# Patient Record
Sex: MALE | Race: WHITE | NOT HISPANIC OR LATINO | URBAN - NONMETROPOLITAN AREA
[De-identification: names, ages, dates, MRNs, and addresses within clinical notes are randomized per-mention and may not be internally consistent; named-entity substitution may affect disease eponyms.]

---

## 2021-01-27 ENCOUNTER — OFFICE VISIT (OUTPATIENT)
Dept: URBAN - NONMETROPOLITAN AREA CLINIC 2 | Facility: CLINIC | Age: 21
End: 2021-01-27

## 2021-02-05 ENCOUNTER — LAB OUTSIDE AN ENCOUNTER (OUTPATIENT)
Dept: URBAN - NONMETROPOLITAN AREA CLINIC 2 | Facility: CLINIC | Age: 21
End: 2021-02-05

## 2021-02-05 ENCOUNTER — OFFICE VISIT (OUTPATIENT)
Dept: URBAN - NONMETROPOLITAN AREA CLINIC 2 | Facility: CLINIC | Age: 21
End: 2021-02-05
Payer: COMMERCIAL

## 2021-02-05 ENCOUNTER — WEB ENCOUNTER (OUTPATIENT)
Dept: URBAN - NONMETROPOLITAN AREA CLINIC 2 | Facility: CLINIC | Age: 21
End: 2021-02-05

## 2021-02-05 DIAGNOSIS — K21.9 GASTROESOPHAGEAL REFLUX DISEASE, UNSPECIFIED WHETHER ESOPHAGITIS PRESENT: ICD-10-CM

## 2021-02-05 PROCEDURE — 99204 OFFICE O/P NEW MOD 45 MIN: CPT | Performed by: INTERNAL MEDICINE

## 2021-02-05 PROCEDURE — G8420 CALC BMI NORM PARAMETERS: HCPCS | Performed by: INTERNAL MEDICINE

## 2021-02-05 PROCEDURE — G9906 PT RECV TBCO CESS INTERV: HCPCS | Performed by: INTERNAL MEDICINE

## 2021-02-05 PROCEDURE — G8427 DOCREV CUR MEDS BY ELIG CLIN: HCPCS | Performed by: INTERNAL MEDICINE

## 2021-02-05 PROCEDURE — G8484 FLU IMMUNIZE NO ADMIN: HCPCS | Performed by: INTERNAL MEDICINE

## 2021-02-05 RX ORDER — PANTOPRAZOLE SODIUM 40 MG/1
1 TABLET TABLET, DELAYED RELEASE ORAL ONCE A DAY
Qty: 90 TABLET | Refills: 3 | OUTPATIENT
Start: 2021-02-05

## 2021-02-05 RX ORDER — FAMOTIDINE 40 MG/1
1 TABLET AT BEDTIME TABLET, FILM COATED ORAL ONCE A DAY
Qty: 90 TABLET | Refills: 3 | OUTPATIENT
Start: 2021-02-05

## 2021-02-05 NOTE — HPI-TODAY'S VISIT:
This is the first office visit for this 20-year-old white male who  is self-referred with complaints of acid reflux.  He  describes burning epigastric pain that is associated with belching and regurgitation of "hot liquid" into his chest.  Is not associated with coughing or sore throat.  It seems to occur more frequently after eating and is most significant in the am upon arising. It will awaken him from sleep at night.  He has spit up some "black" liquid.  He has had no bright red hematemesis.  He denies melena.  There is some questionable mild solid food dysphagia.  Tums give him temporary relief.  He takes Nexium as needed which helps as well. He denies ingestion of gastric irritants.  There is no family history of peptic ulcer disease.  He does vape and drink alcohol about once per week.  He also smokes marijuana. His bowel movements are normal.  His appetite and weight are stable.

## 2021-03-23 ENCOUNTER — CLAIMS CREATED FROM THE CLAIM WINDOW (OUTPATIENT)
Dept: URBAN - METROPOLITAN AREA CLINIC 4 | Facility: CLINIC | Age: 21
End: 2021-03-23
Payer: COMMERCIAL

## 2021-03-23 ENCOUNTER — OFFICE VISIT (OUTPATIENT)
Dept: URBAN - NONMETROPOLITAN AREA SURGERY CENTER 1 | Facility: SURGERY CENTER | Age: 21
End: 2021-03-23
Payer: COMMERCIAL

## 2021-03-23 DIAGNOSIS — K21.00 GASTRO-ESOPHAGEAL REFLUX DISEASE WITH ESOPHAGITIS, WITHOUT BLEEDING: ICD-10-CM

## 2021-03-23 DIAGNOSIS — K31.89 ISOLATED IDIOPATHIC GRANULOMA OF STOMACH: ICD-10-CM

## 2021-03-23 DIAGNOSIS — K22.8 OTHER SPECIFIED DISEASES OF ESOPHAGUS: ICD-10-CM

## 2021-03-23 DIAGNOSIS — K21.9 ACID REFLUX: ICD-10-CM

## 2021-03-23 PROCEDURE — 43239 EGD BIOPSY SINGLE/MULTIPLE: CPT | Performed by: INTERNAL MEDICINE

## 2021-03-23 PROCEDURE — 88305 TISSUE EXAM BY PATHOLOGIST: CPT | Performed by: PATHOLOGY

## 2021-03-23 PROCEDURE — 88312 SPECIAL STAINS GROUP 1: CPT | Performed by: PATHOLOGY

## 2021-03-23 PROCEDURE — G8907 PT DOC NO EVENTS ON DISCHARG: HCPCS | Performed by: INTERNAL MEDICINE

## 2021-03-30 ENCOUNTER — TELEPHONE ENCOUNTER (OUTPATIENT)
Dept: URBAN - METROPOLITAN AREA CLINIC 92 | Facility: CLINIC | Age: 21
End: 2021-03-30

## 2021-05-07 ENCOUNTER — OFFICE VISIT (OUTPATIENT)
Dept: URBAN - NONMETROPOLITAN AREA CLINIC 13 | Facility: CLINIC | Age: 21
End: 2021-05-07

## 2021-05-12 ENCOUNTER — LAB OUTSIDE AN ENCOUNTER (OUTPATIENT)
Dept: URBAN - METROPOLITAN AREA CLINIC 92 | Facility: CLINIC | Age: 21
End: 2021-05-12

## 2021-05-24 ENCOUNTER — OFFICE VISIT (OUTPATIENT)
Dept: URBAN - METROPOLITAN AREA MEDICAL CENTER 1 | Facility: MEDICAL CENTER | Age: 21
End: 2021-05-24
Payer: COMMERCIAL

## 2021-05-24 DIAGNOSIS — Z12.11 COLON CANCER SCREENING: ICD-10-CM

## 2021-05-24 PROCEDURE — 996 AG2 NON-BILLABLE: Performed by: INTERNAL MEDICINE

## 2021-06-21 ENCOUNTER — LAB OUTSIDE AN ENCOUNTER (OUTPATIENT)
Dept: URBAN - NONMETROPOLITAN AREA CLINIC 2 | Facility: CLINIC | Age: 21
End: 2021-06-21

## 2021-06-21 ENCOUNTER — OFFICE VISIT (OUTPATIENT)
Dept: URBAN - NONMETROPOLITAN AREA CLINIC 2 | Facility: CLINIC | Age: 21
End: 2021-06-21
Payer: COMMERCIAL

## 2021-06-21 DIAGNOSIS — K21.9 GASTROESOPHAGEAL REFLUX DISEASE, UNSPECIFIED WHETHER ESOPHAGITIS PRESENT: ICD-10-CM

## 2021-06-21 DIAGNOSIS — Z72.0 TOBACCO ABUSE: ICD-10-CM

## 2021-06-21 PROCEDURE — 99214 OFFICE O/P EST MOD 30 MIN: CPT | Performed by: INTERNAL MEDICINE

## 2021-06-21 RX ORDER — FAMOTIDINE 40 MG/1
1 TABLET AT BEDTIME TABLET, FILM COATED ORAL ONCE A DAY
Qty: 90 TABLET | Refills: 3 | Status: ON HOLD | COMMUNITY
Start: 2021-02-05

## 2021-06-21 RX ORDER — PANTOPRAZOLE SODIUM 40 MG/1
1 TABLET TABLET, DELAYED RELEASE ORAL BID
OUTPATIENT
Start: 2021-02-05

## 2021-06-21 RX ORDER — PANTOPRAZOLE SODIUM 40 MG/1
1 TABLET TABLET, DELAYED RELEASE ORAL BID
Qty: 180 TABLET | Refills: 3 | Status: ACTIVE | COMMUNITY
Start: 2021-02-05

## 2021-06-21 NOTE — HPI-TODAY'S VISIT:
2/5/21-Dr. Barbour This is the first office visit for this 20-year-old white male who  is self-referred with complaints of acid reflux.  He  describes burning epigastric pain that is associated with belching and regurgitation of "hot liquid" into his chest.  Is not associated with coughing or sore throat.  It seems to occur more frequently after eating and is most significant in the am upon arising. It will awaken him from sleep at night.  He has spit up some "black" liquid.  He has had no bright red hematemesis.  He denies melena.  There is some questionable mild solid food dysphagia.  Tums give him temporary relief.  He takes Nexium as needed which helps as well. He denies ingestion of gastric irritants.  There is no family history of peptic ulcer disease.  He does vape and drink alcohol about once per week.  He also smokes marijuana. His bowel movements are normal.  His appetite and weight are stable.  6/21/21  Blair presents for follow  up for GERD after EGD.  EGD 3/23/21-Dr. Barbour-esophageal mucosa changes suspicious for SS Barretts at th eGE junction, Bx for histology randomly at the GE junction. Gastric bx-no significant abnormality. Esophageal bx with reflux changes, no Barretts or EE. Dr. Barbour recommended repeat in 6-8 weeks that was scheduled for May. He also ordered UGI. Blair had some scheduling conflicts with school and has not complete these yet. He is ready to reschedule the EGD and UGI.  He is on pantoprazole twice daily. Overall he feels like his symptoms are much better on pantoprazole 40mg BID but he continues to wak up early mornings, often around 4AM with breakthrough symptoms. He is more sensitive to gagging as well. No vomiting. He has burning up into the back of his throat. He has cut back on etoh and is dropping his nicotine use as well. Continues to smoke marijuana. TG

## 2021-06-23 ENCOUNTER — TELEPHONE ENCOUNTER (OUTPATIENT)
Dept: URBAN - METROPOLITAN AREA CLINIC 23 | Facility: CLINIC | Age: 21
End: 2021-06-23

## 2021-06-23 ENCOUNTER — OFFICE VISIT (OUTPATIENT)
Dept: URBAN - NONMETROPOLITAN AREA SURGERY CENTER 1 | Facility: SURGERY CENTER | Age: 21
End: 2021-06-23
Payer: COMMERCIAL

## 2021-06-23 ENCOUNTER — ERX REFILL RESPONSE (OUTPATIENT)
Dept: URBAN - METROPOLITAN AREA CLINIC 92 | Facility: CLINIC | Age: 21
End: 2021-06-23

## 2021-06-23 ENCOUNTER — CLAIMS CREATED FROM THE CLAIM WINDOW (OUTPATIENT)
Dept: URBAN - METROPOLITAN AREA CLINIC 4 | Facility: CLINIC | Age: 21
End: 2021-06-23
Payer: COMMERCIAL

## 2021-06-23 ENCOUNTER — TELEPHONE ENCOUNTER (OUTPATIENT)
Dept: URBAN - METROPOLITAN AREA CLINIC 92 | Facility: CLINIC | Age: 21
End: 2021-06-23

## 2021-06-23 DIAGNOSIS — K31.89 MESENTEROAXIAL GASTRIC VOLVULUS: ICD-10-CM

## 2021-06-23 DIAGNOSIS — K21.9 ACID REFLUX: ICD-10-CM

## 2021-06-23 DIAGNOSIS — K21.9 GASTRO-ESOPHAGEAL REFLUX DISEASE WITHOUT ESOPHAGITIS: ICD-10-CM

## 2021-06-23 PROBLEM — 266435005: Status: ACTIVE | Noted: 2021-06-23

## 2021-06-23 PROCEDURE — 88305 TISSUE EXAM BY PATHOLOGIST: CPT | Performed by: PATHOLOGY

## 2021-06-23 PROCEDURE — 88312 SPECIAL STAINS GROUP 1: CPT | Performed by: PATHOLOGY

## 2021-06-23 PROCEDURE — G8907 PT DOC NO EVENTS ON DISCHARG: HCPCS | Performed by: INTERNAL MEDICINE

## 2021-06-23 PROCEDURE — 43239 EGD BIOPSY SINGLE/MULTIPLE: CPT | Performed by: INTERNAL MEDICINE

## 2021-06-23 RX ORDER — FAMOTIDINE 40 MG/1
1 TABLET AT BEDTIME TABLET, FILM COATED ORAL ONCE A DAY
Qty: 90 TABLET | Refills: 3 | Status: ON HOLD | COMMUNITY
Start: 2021-02-05

## 2021-06-23 RX ORDER — PANTOPRAZOLE SODIUM 40 MG/1
1 TABLET TABLET, DELAYED RELEASE ORAL BID
Status: ACTIVE | COMMUNITY
Start: 2021-02-05

## 2021-06-23 RX ORDER — PANTOPRAZOLE SODIUM 40 MG/1
TAKE 1 TABLET BY MOUTH TWICE A DAY TABLET, DELAYED RELEASE ORAL
Qty: 180 TABLET | Refills: 3 | OUTPATIENT

## 2021-06-23 RX ORDER — PANTOPRAZOLE SODIUM 40 MG/1
1 TABLET TABLET, DELAYED RELEASE ORAL BID
Qty: 180 | Refills: 3 | OUTPATIENT

## 2021-06-23 RX ORDER — PANTOPRAZOLE SODIUM 40 MG/1
1 TABLET TABLET, DELAYED RELEASE ORAL BID
Qty: 180 TABLET | Refills: 3 | OUTPATIENT
Start: 2021-06-23

## 2021-07-22 ENCOUNTER — OFFICE VISIT (OUTPATIENT)
Dept: URBAN - NONMETROPOLITAN AREA CLINIC 13 | Facility: CLINIC | Age: 21
End: 2021-07-22
Payer: COMMERCIAL

## 2021-07-22 DIAGNOSIS — K21.9 GASTROESOPHAGEAL REFLUX DISEASE, UNSPECIFIED WHETHER ESOPHAGITIS PRESENT: ICD-10-CM

## 2021-07-22 DIAGNOSIS — Z72.0 TOBACCO ABUSE: ICD-10-CM

## 2021-07-22 PROCEDURE — 99213 OFFICE O/P EST LOW 20 MIN: CPT | Performed by: INTERNAL MEDICINE

## 2021-07-22 RX ORDER — FAMOTIDINE 40 MG/1
1 TABLET AT BEDTIME TABLET, FILM COATED ORAL ONCE A DAY
Qty: 90 TABLET | Refills: 3 | Status: DISCONTINUED | COMMUNITY
Start: 2021-02-05

## 2021-07-22 RX ORDER — PANTOPRAZOLE SODIUM 40 MG/1
1 TABLET ORALLY ONCE A DAY TABLET, DELAYED RELEASE ORAL
Refills: 3 | Status: ACTIVE | COMMUNITY

## 2021-07-22 RX ORDER — OMEPRAZOLE 20 MG/1
2 CAPSULE 30 MINUTES BEFORE MORNING MEAL CAPSULE, DELAYED RELEASE ORAL ONCE A DAY
OUTPATIENT

## 2021-07-22 RX ORDER — OMEPRAZOLE 20 MG/1
2 CAPSULE 30 MINUTES BEFORE MORNING MEAL CAPSULE, DELAYED RELEASE ORAL ONCE A DAY
Status: ACTIVE | COMMUNITY

## 2021-07-22 RX ORDER — PANTOPRAZOLE SODIUM 40 MG/1
1 TABLET TABLET, DELAYED RELEASE ORAL ONCE A DAY
Qty: 90 TABLET | Refills: 3 | OUTPATIENT

## 2021-07-22 NOTE — HPI-TODAY'S VISIT:
2/5/21-Dr. Barbour This is the first office visit for this 20-year-old white male who  is self-referred with complaints of acid reflux.  He  describes burning epigastric pain that is associated with belching and regurgitation of "hot liquid" into his chest.  Is not associated with coughing or sore throat.  It seems to occur more frequently after eating and is most significant in the am upon arising. It will awaken him from sleep at night.  He has spit up some "black" liquid.  He has had no bright red hematemesis.  He denies melena.  There is some questionable mild solid food dysphagia.  Tums give him temporary relief.  He takes Nexium as needed which helps as well. He denies ingestion of gastric irritants.  There is no family history of peptic ulcer disease.  He does vape and drink alcohol about once per week.  He also smokes marijuana. His bowel movements are normal.  His appetite and weight are stable.  6/21/21  Blair presents for follow  up for GERD after EGD.  EGD 3/23/21-Dr. Barbour-esophageal mucosa changes suspicious for SS Barretts at th eGE junction, Bx for histology randomly at the GE junction. Gastric bx-no significant abnormality. Esophageal bx with reflux changes, no Barretts or EE. Dr. Barbour recommended repeat in 6-8 weeks that was scheduled for May. He also ordered UGI. Blair had some scheduling conflicts with school and has not complete these yet. He is ready to reschedule the EGD and UGI.  He is on pantoprazole twice daily. Overall he feels like his symptoms are much better on pantoprazole 40mg BID but he continues to wak up early mornings, often around 4AM with breakthrough symptoms. He is more sensitive to gagging as well. No vomiting. He has burning up into the back of his throat. He has cut back on etoh and is dropping his nicotine use as well. Continues to smoke marijuana. TG  7/22/21 Blair is a 19 YO M with GERD who presents for follow up after repeat EGD and completing UGI.  EGD 6/23/21 with irregular z line at 37cm with prominence and some tongues of salmon colored mucosa s/p repeat biopsies, normal stomach and duodenum. Esophageal bx c/w reflux type changes. GE junction bx also shows reflux changes but no Barretts. There is no EoE. Gastric bx with no significant abnormality. His UGI was normal.  Blair reports his reflux symptoms are significantly improved. He is not waking up in the early morning with symptoms any longer. He is taking pantoprazole 40mg once daily in the morning before breakfast. He is OTC Prilosec for the supper dose as BID pantoprazole was not covered by his insurance. Dietary changes including eliminating tomato products, reducing etoh, stopping nicotine, and not eating 3 hours before bedtime and drinking 1 hour before bedtime have reduced symptoms. TG

## 2021-12-09 ENCOUNTER — OFFICE VISIT (OUTPATIENT)
Dept: URBAN - NONMETROPOLITAN AREA CLINIC 13 | Facility: CLINIC | Age: 21
End: 2021-12-09

## 2021-12-09 ENCOUNTER — WEB ENCOUNTER (OUTPATIENT)
Dept: URBAN - NONMETROPOLITAN AREA CLINIC 13 | Facility: CLINIC | Age: 21
End: 2021-12-09

## 2021-12-09 ENCOUNTER — DASHBOARD ENCOUNTERS (OUTPATIENT)
Age: 21
End: 2021-12-09

## 2021-12-09 ENCOUNTER — OFFICE VISIT (OUTPATIENT)
Dept: URBAN - NONMETROPOLITAN AREA CLINIC 13 | Facility: CLINIC | Age: 21
End: 2021-12-09
Payer: COMMERCIAL

## 2021-12-09 DIAGNOSIS — Z72.0 TOBACCO ABUSE: ICD-10-CM

## 2021-12-09 DIAGNOSIS — K21.9 GASTROESOPHAGEAL REFLUX DISEASE, UNSPECIFIED WHETHER ESOPHAGITIS PRESENT: ICD-10-CM

## 2021-12-09 PROBLEM — 235595009 GASTROESOPHAGEAL REFLUX DISEASE: Status: ACTIVE | Noted: 2021-12-09

## 2021-12-09 PROCEDURE — 99213 OFFICE O/P EST LOW 20 MIN: CPT | Performed by: NURSE PRACTITIONER

## 2021-12-09 RX ORDER — PANTOPRAZOLE SODIUM 40 MG/1
1 TABLET TABLET, DELAYED RELEASE ORAL ONCE A DAY
Qty: 90 TABLET | Refills: 3

## 2021-12-09 RX ORDER — OMEPRAZOLE 40 MG/1
1 CAPSULE 30 MINUTES BEFORE MORNING MEAL CAPSULE, DELAYED RELEASE PELLETS ORAL ONCE A DAY
Qty: 30 CAPSULES | Refills: 11 | OUTPATIENT
Start: 2021-12-09

## 2021-12-09 RX ORDER — PANTOPRAZOLE SODIUM 40 MG/1
1 TABLET ORALLY ONCE A DAY TABLET, DELAYED RELEASE ORAL
Refills: 3 | Status: ACTIVE | COMMUNITY

## 2021-12-09 RX ORDER — PANTOPRAZOLE SODIUM 40 MG/1
1 TABLET TABLET, DELAYED RELEASE ORAL ONCE A DAY
Qty: 90 TABLET | Refills: 3 | Status: ACTIVE | COMMUNITY

## 2021-12-09 RX ORDER — FAMOTIDINE 40 MG/1
1 TABLET AT BEDTIME TABLET, FILM COATED ORAL ONCE A DAY
Qty: 90 TABLET | Refills: 3 | OUTPATIENT
Start: 2021-12-09

## 2021-12-09 RX ORDER — OMEPRAZOLE 20 MG/1
2 CAPSULE 30 MINUTES BEFORE MORNING MEAL CAPSULE, DELAYED RELEASE ORAL ONCE A DAY
Status: ACTIVE | COMMUNITY

## 2021-12-09 NOTE — HPI-TODAY'S VISIT:
2/5/21-Dr. Barbour This is the first office visit for this 20-year-old white male who  is self-referred with complaints of acid reflux.  He  describes burning epigastric pain that is associated with belching and regurgitation of "hot liquid" into his chest.  Is not associated with coughing or sore throat.  It seems to occur more frequently after eating and is most significant in the am upon arising. It will awaken him from sleep at night.  He has spit up some "black" liquid.  He has had no bright red hematemesis.  He denies melena.  There is some questionable mild solid food dysphagia.  Tums give him temporary relief.  He takes Nexium as needed which helps as well. He denies ingestion of gastric irritants.  There is no family history of peptic ulcer disease.  He does vape and drink alcohol about once per week.  He also smokes marijuana. His bowel movements are normal.  His appetite and weight are stable.  6/21/21  Blair presents for follow  up for GERD after EGD.  EGD 3/23/21-Dr. Barbour-esophageal mucosa changes suspicious for SS Barretts at th eGE junction, Bx for histology randomly at the GE junction. Gastric bx-no significant abnormality. Esophageal bx with reflux changes, no Barretts or EE. Dr. Barbour recommended repeat in 6-8 weeks that was scheduled for May. He also ordered UGI. Blair had some scheduling conflicts with school and has not complete these yet. He is ready to reschedule the EGD and UGI.  He is on pantoprazole twice daily. Overall he feels like his symptoms are much better on pantoprazole 40mg BID but he continues to wak up early mornings, often around 4AM with breakthrough symptoms. He is more sensitive to gagging as well. No vomiting. He has burning up into the back of his throat. He has cut back on etoh and is dropping his nicotine use as well. Continues to smoke marijuana. TG  7/22/21 Blair is a 21 YO M with GERD who presents for follow up after repeat EGD and completing UGI.  EGD 6/23/21 with irregular z line at 37cm with prominence and some tongues of salmon colored mucosa s/p repeat biopsies, normal stomach and duodenum. Esophageal bx c/w reflux type changes. GE junction bx also shows reflux changes but no Barretts. There is no EoE. Gastric bx with no significant abnormality. His UGI was normal.  Blair reports his reflux symptoms are significantly improved. He is not waking up in the early morning with symptoms any longer. He is taking pantoprazole 40mg once daily in the morning before breakfast. He is OTC Prilosec for the supper dose as BID pantoprazole was not covered by his insurance. Dietary changes including eliminating tomato products, reducing etoh, stopping nicotine, and not eating 3 hours before bedtime and drinking 1 hour before bedtime have reduced symptoms. TG  12/9/2021 Blair presents for follow up for GERD. He is struggling with symptoms throughout the day. He is not waking up with symptoms at night but does have heartburn as soon as he wakes up in the mornigns. No nausea/vomiting. He agrees his diet is contributing to his symptoms. He has gained some weight. He has been more stressed with pharmacy school. He is not taking medications as prescribed about 50% of the time. He has a hard time with the pre meal dosing due to his lifestyle and school. He did better with compliance when he was on Pepcid at bedtime with pantoprazole in the mornings. Denies dysphagia. TG

## 2022-03-17 ENCOUNTER — OFFICE VISIT (OUTPATIENT)
Dept: URBAN - NONMETROPOLITAN AREA CLINIC 13 | Facility: CLINIC | Age: 22
End: 2022-03-17

## 2022-08-15 ENCOUNTER — WEB ENCOUNTER (OUTPATIENT)
Dept: URBAN - NONMETROPOLITAN AREA CLINIC 2 | Facility: CLINIC | Age: 22
End: 2022-08-15